# Patient Record
Sex: FEMALE | Race: WHITE | NOT HISPANIC OR LATINO | Employment: OTHER | URBAN - METROPOLITAN AREA
[De-identification: names, ages, dates, MRNs, and addresses within clinical notes are randomized per-mention and may not be internally consistent; named-entity substitution may affect disease eponyms.]

---

## 2020-04-14 ENCOUNTER — OFFICE VISIT (OUTPATIENT)
Dept: GASTROENTEROLOGY | Facility: CLINIC | Age: 45
End: 2020-04-14
Payer: COMMERCIAL

## 2020-04-14 VITALS
HEIGHT: 65 IN | BODY MASS INDEX: 26.66 KG/M2 | DIASTOLIC BLOOD PRESSURE: 82 MMHG | SYSTOLIC BLOOD PRESSURE: 120 MMHG | WEIGHT: 160 LBS

## 2020-04-14 DIAGNOSIS — K62.5 RECTAL BLEEDING: ICD-10-CM

## 2020-04-14 DIAGNOSIS — K64.8 INTERNAL HEMORRHOIDS: Primary | ICD-10-CM

## 2020-04-14 PROCEDURE — 46221 LIGATION OF HEMORRHOID(S): CPT | Performed by: INTERNAL MEDICINE

## 2020-04-28 ENCOUNTER — OFFICE VISIT (OUTPATIENT)
Dept: GASTROENTEROLOGY | Facility: CLINIC | Age: 45
End: 2020-04-28
Payer: COMMERCIAL

## 2020-04-28 DIAGNOSIS — K64.8 INTERNAL HEMORRHOIDS: ICD-10-CM

## 2020-04-28 PROCEDURE — 46221 LIGATION OF HEMORRHOID(S): CPT | Performed by: INTERNAL MEDICINE

## 2021-03-10 DIAGNOSIS — Z23 ENCOUNTER FOR IMMUNIZATION: ICD-10-CM

## 2021-03-13 ENCOUNTER — OFFICE VISIT (OUTPATIENT)
Dept: URGENT CARE | Facility: CLINIC | Age: 46
End: 2021-03-13
Payer: COMMERCIAL

## 2021-03-13 VITALS
BODY MASS INDEX: 26.93 KG/M2 | HEART RATE: 96 BPM | TEMPERATURE: 97.4 F | DIASTOLIC BLOOD PRESSURE: 69 MMHG | WEIGHT: 167.6 LBS | HEIGHT: 66 IN | OXYGEN SATURATION: 100 % | SYSTOLIC BLOOD PRESSURE: 157 MMHG | RESPIRATION RATE: 16 BRPM

## 2021-03-13 DIAGNOSIS — L03.011 CELLULITIS OF FINGER OF RIGHT HAND: Primary | ICD-10-CM

## 2021-03-13 PROCEDURE — 99213 OFFICE O/P EST LOW 20 MIN: CPT | Performed by: PHYSICIAN ASSISTANT

## 2021-03-13 RX ORDER — DOXYCYCLINE HYCLATE 100 MG/1
100 CAPSULE ORAL EVERY 12 HOURS SCHEDULED
Qty: 14 CAPSULE | Refills: 0 | Status: SHIPPED | OUTPATIENT
Start: 2021-03-13 | End: 2021-03-20

## 2021-03-14 NOTE — PROGRESS NOTES
[id number]        NAME: Bobbi Galeana is a 50 y o  female  : 1975    MRN: [id number]  DATE: 2021  TIME: 7:19 PM    Assessment and Plan   Cellulitis of finger of right hand [L03 011]  1  Cellulitis of finger of right hand  doxycycline hyclate (VIBRAMYCIN) 100 mg capsule         Patient Instructions     Take antibiotic as directed with food  Recommend probiotics for side effects  Continue with over-the-counter symptom relief  Monitor for worsening symptoms  C/w Epsom salt soaks  Follow up with PCP in 3-5 days  Proceed to  ER if symptoms worsen  Chief Complaint     Chief Complaint   Patient presents with    Finger Injury     Tuesday, R pointer finger DIJ and distally finger became sore  Now red, swollen, white head  Pt reports UTD with tetenus  History of Present Illness       Patient presents with complaint of pain and swelling in her right index finger for the past 4 days  She denies any known injury but suspects a hair splinter may have caused the infection  Patient reports soaking in Epsom salts without significant improvement  She denies fever, chills, sweats, paresthesias  She reports that the pain is mild and describes it as a tightness  She denies known antibiotic allergies and recent antibiotic use  Review of Systems   Review of Systems   Constitutional: Negative for chills and fever  HENT: Negative for ear pain and sore throat  Eyes: Negative for pain and visual disturbance  Respiratory: Negative for cough and shortness of breath  Cardiovascular: Negative for chest pain and palpitations  Gastrointestinal: Negative for abdominal pain and vomiting  Genitourinary: Negative for dysuria and hematuria  Musculoskeletal: Positive for joint swelling  Negative for arthralgias and back pain  Skin: Positive for color change  Negative for rash  Neurological: Negative for seizures and syncope     All other systems reviewed and are negative  Current Medications       Current Outpatient Medications:     doxycycline hyclate (VIBRAMYCIN) 100 mg capsule, Take 1 capsule (100 mg total) by mouth every 12 (twelve) hours for 7 days, Disp: 14 capsule, Rfl: 0    Current Allergies     Allergies as of 03/13/2021    (No Known Allergies)            The following portions of the patient's history were reviewed and updated as appropriate: allergies, current medications, past family history, past medical history, past social history, past surgical history and problem list      History reviewed  No pertinent past medical history  Past Surgical History:   Procedure Laterality Date    COLONOSCOPY  10/30/2017    Rectal bleeding, internal hemorrhoids    HEMORROIDECTOMY         Family History   Problem Relation Age of Onset    Heart disease Father          Medications have been verified  Objective   /69   Pulse 96   Temp (!) 97 4 °F (36 3 °C)   Resp 16   Ht 5' 5 5" (1 664 m)   Wt 76 kg (167 lb 9 6 oz)   SpO2 100%   BMI 27 47 kg/m²   No LMP recorded  Physical Exam     Physical Exam  Vitals signs and nursing note reviewed  Constitutional:       General: She is not in acute distress  Appearance: Normal appearance  She is well-developed  She is not ill-appearing or diaphoretic  HENT:      Head: Normocephalic and atraumatic  Eyes:      Conjunctiva/sclera: Conjunctivae normal       Pupils: Pupils are equal, round, and reactive to light  Neck:      Musculoskeletal: Normal range of motion and neck supple  Cardiovascular:      Rate and Rhythm: Normal rate and regular rhythm  Heart sounds: Normal heart sounds  Pulmonary:      Effort: Pulmonary effort is normal  No respiratory distress  Breath sounds: Normal breath sounds  No stridor  No wheezing, rhonchi or rales  Musculoskeletal: Normal range of motion  General: Swelling and tenderness present     Lymphadenopathy:      Cervical: No cervical adenopathy  Skin:     General: Skin is warm and dry  Capillary Refill: Capillary refill takes less than 2 seconds  Findings: Erythema present  No rash  Comments: Erythema and swelling of distal phalanx of right 2nd finger; sensation intact; cap refill <2; full active range of motion   Neurological:      Mental Status: She is alert and oriented to person, place, and time  Cranial Nerves: No cranial nerve deficit  Sensory: No sensory deficit  Psychiatric:         Behavior: Behavior normal          Thought Content:  Thought content normal

## 2021-03-29 ENCOUNTER — IMMUNIZATIONS (OUTPATIENT)
Dept: FAMILY MEDICINE CLINIC | Facility: HOSPITAL | Age: 46
End: 2021-03-29

## 2021-03-29 DIAGNOSIS — Z23 ENCOUNTER FOR IMMUNIZATION: Primary | ICD-10-CM

## 2021-03-29 PROCEDURE — 91301 SARS-COV-2 / COVID-19 MRNA VACCINE (MODERNA) 100 MCG: CPT

## 2021-03-29 PROCEDURE — 0011A SARS-COV-2 / COVID-19 MRNA VACCINE (MODERNA) 100 MCG: CPT

## 2021-04-30 ENCOUNTER — IMMUNIZATIONS (OUTPATIENT)
Dept: FAMILY MEDICINE CLINIC | Facility: HOSPITAL | Age: 46
End: 2021-04-30

## 2021-04-30 DIAGNOSIS — Z23 ENCOUNTER FOR IMMUNIZATION: Primary | ICD-10-CM

## 2021-04-30 PROCEDURE — 91301 SARS-COV-2 / COVID-19 MRNA VACCINE (MODERNA) 100 MCG: CPT

## 2021-04-30 PROCEDURE — 0012A SARS-COV-2 / COVID-19 MRNA VACCINE (MODERNA) 100 MCG: CPT

## 2021-12-01 ENCOUNTER — VBI (OUTPATIENT)
Dept: ADMINISTRATIVE | Facility: OTHER | Age: 46
End: 2021-12-01

## 2021-12-11 PROBLEM — Z80.3 FAMILY HISTORY OF BREAST CANCER: Status: ACTIVE | Noted: 2021-12-11

## 2022-02-11 ENCOUNTER — ANNUAL EXAM (OUTPATIENT)
Dept: OBGYN CLINIC | Facility: CLINIC | Age: 47
End: 2022-02-11
Payer: COMMERCIAL

## 2022-02-11 VITALS
HEIGHT: 65 IN | WEIGHT: 166.4 LBS | BODY MASS INDEX: 27.72 KG/M2 | SYSTOLIC BLOOD PRESSURE: 100 MMHG | DIASTOLIC BLOOD PRESSURE: 60 MMHG

## 2022-02-11 DIAGNOSIS — Z12.31 BREAST CANCER SCREENING BY MAMMOGRAM: ICD-10-CM

## 2022-02-11 DIAGNOSIS — Z01.419 ENCOUNTER FOR GYNECOLOGICAL EXAMINATION (GENERAL) (ROUTINE) WITHOUT ABNORMAL FINDINGS: Primary | ICD-10-CM

## 2022-02-11 DIAGNOSIS — Z12.4 SCREENING FOR MALIGNANT NEOPLASM OF THE CERVIX: ICD-10-CM

## 2022-02-11 DIAGNOSIS — R92.2 DENSE BREAST TISSUE ON MAMMOGRAM: ICD-10-CM

## 2022-02-11 DIAGNOSIS — Z80.3 FAMILY HISTORY OF BREAST CANCER IN FEMALE: ICD-10-CM

## 2022-02-11 DIAGNOSIS — Z80.3 FAMILY HISTORY OF BREAST CANCER: ICD-10-CM

## 2022-02-11 PROCEDURE — S0612 ANNUAL GYNECOLOGICAL EXAMINA: HCPCS | Performed by: OBSTETRICS & GYNECOLOGY

## 2022-02-11 NOTE — ASSESSMENT & PLAN NOTE
All well, no complaints  Normal breast and pelvic exams   Pap done  Mammo order given with screening u/s for dense tissue and h/o u/s requirements following mammograms  Colonoscopy 2017, due 2023

## 2022-02-11 NOTE — PROGRESS NOTES
Assessment/Plan:    Encounter for gynecological examination (general) (routine) without abnormal findings  All well, no complaints  Normal breast and pelvic exams  Pap done  Mammo order given with screening u/s for dense tissue and h/o u/s requirements following mammograms  Colonoscopy 2017, due 2023       Diagnoses and all orders for this visit:    Encounter for gynecological examination (general) (routine) without abnormal findings    Family history of breast cancer in female  -     Mammo screening bilateral w 3d & cad; Future    Breast cancer screening by mammogram  -     Mammo screening bilateral w 3d & cad; Future    Screening for malignant neoplasm of the cervix  -     IGP, Aptima HPV, Rfx 16/18,45    Dense breast tissue on mammogram  -     US breast screening bilateral complete (ABUS); Future    Family history of breast cancer - maternal grandmother, paternal aunt          Subjective:      Patient ID: James Bradford is a 48 y o  female  Here for well check  The following portions of the patient's history were reviewed and updated as appropriate:   She  has a past medical history of Menorrhagia and VSD (ventricular septal defect)  She   Patient Active Problem List    Diagnosis Date Noted    Encounter for gynecological examination (general) (routine) without abnormal findings 02/11/2022    Family history of breast cancer - maternal grandmother, paternal aunt 12/11/2021     She  has a past surgical history that includes Colonoscopy (10/30/2017); Hemorroidectomy (2021); and Mammo (historical) (11/01/2021)  Her family history includes Breast cancer in her maternal grandmother and paternal aunt; Colon cancer in her maternal grandmother; Diabetes in her maternal grandfather and paternal grandfather; Heart disease in her father; Hypertension in her maternal grandfather; Liver cancer in her maternal grandmother; Thyroid cancer in her cousin and paternal aunt  She  reports that she has never smoked   She has never used smokeless tobacco  She reports current alcohol use  She reports that she does not use drugs  No current outpatient medications on file  No current facility-administered medications for this visit  She has No Known Allergies       Review of Systems  No breast, bladder, bowel changes  No new persistent pain, bloating, early satiety or pelvic pressure   Vaginal irritation  No menorrhagia, dysmenorrhea, intermenstrual bleeding      Objective:      /60 (BP Location: Left arm, Patient Position: Sitting, Cuff Size: Large)   Ht 5' 4 5" (1 638 m)   Wt 75 5 kg (166 lb 6 4 oz)   LMP 01/07/2022 (Exact Date)   Breastfeeding No   BMI 28 12 kg/m²          Physical Exam  General appearance: no distress, pleasant  Neck: thyroid without nodules or thyromegaly, no palpable adenopathy  Lymph nodes: no palpable adenopathy  Breasts: no masses, nodes or skin changes  Abdomen: soft, non tender, no palpable masses  Pelvic exam: normal external genitalia with tiny WE medial left labia minora c/w irritation, urethral meatus normal, vagina without lesions, cervix without lesions, uterus small, non tender, no adnexal masses, non tender  Rectal exam: no masses, non tender, RV confirms above

## 2022-02-11 NOTE — LETTER
February 11, 2022     [address] [employer]    Patient: Miranda Higgins   YOB: 1975   Date of Visit: 2/11/2022       Dear Dr Dipika Jay: Thank you for referring Pau Ashrafita to me for evaluation  Below are my notes for this consultation  If you have questions, please do not hesitate to call me  I look forward to following your patient along with you  Sincerely,        Arnaud Anderson MD        CC: No Recipients  Arnaud Anderson MD  2/11/2022  3:53 PM  Sign when Signing Visit  Assessment/Plan:    Encounter for gynecological examination (general) (routine) without abnormal findings  All well, no complaints  Normal breast and pelvic exams  Pap done  Mammo order given with screening u/s for dense tissue and h/o u/s requirements following mammograms  Colonoscopy 2017, due 2023       Diagnoses and all orders for this visit:    Encounter for gynecological examination (general) (routine) without abnormal findings    Family history of breast cancer in female  -     Mammo screening bilateral w 3d & cad; Future    Breast cancer screening by mammogram  -     Mammo screening bilateral w 3d & cad; Future    Screening for malignant neoplasm of the cervix  -     IGP, Aptima HPV, Rfx 16/18,45    Dense breast tissue on mammogram  -     US breast screening bilateral complete (ABUS); Future    Family history of breast cancer - maternal grandmother, paternal aunt          Subjective:      Patient ID: Miranda Higgins is a 48 y o  female  Here for well check  The following portions of the patient's history were reviewed and updated as appropriate:   She  has a past medical history of Menorrhagia and VSD (ventricular septal defect)    She   Patient Active Problem List    Diagnosis Date Noted    Encounter for gynecological examination (general) (routine) without abnormal findings 02/11/2022    Family history of breast cancer - maternal grandmother, paternal aunt 12/11/2021 She  has a past surgical history that includes Colonoscopy (10/30/2017); Hemorroidectomy (2021); and Mammo (historical) (11/01/2021)  Her family history includes Breast cancer in her maternal grandmother and paternal aunt; Colon cancer in her maternal grandmother; Diabetes in her maternal grandfather and paternal grandfather; Heart disease in her father; Hypertension in her maternal grandfather; Liver cancer in her maternal grandmother; Thyroid cancer in her cousin and paternal aunt  She  reports that she has never smoked  She has never used smokeless tobacco  She reports current alcohol use  She reports that she does not use drugs  No current outpatient medications on file  No current facility-administered medications for this visit  She has No Known Allergies       Review of Systems  No breast, bladder, bowel changes  No new persistent pain, bloating, early satiety or pelvic pressure   Vaginal irritation  No menorrhagia, dysmenorrhea, intermenstrual bleeding      Objective:      /60 (BP Location: Left arm, Patient Position: Sitting, Cuff Size: Large)   Ht 5' 4 5" (1 638 m)   Wt 75 5 kg (166 lb 6 4 oz)   LMP 01/07/2022 (Exact Date)   Breastfeeding No   BMI 28 12 kg/m²          Physical Exam  General appearance: no distress, pleasant  Neck: thyroid without nodules or thyromegaly, no palpable adenopathy  Lymph nodes: no palpable adenopathy  Breasts: no masses, nodes or skin changes  Abdomen: soft, non tender, no palpable masses  Pelvic exam: normal external genitalia with tiny WE medial left labia minora c/w irritation, urethral meatus normal, vagina without lesions, cervix without lesions, uterus small, non tender, no adnexal masses, non tender  Rectal exam: no masses, non tender, RV confirms above

## 2022-02-11 NOTE — PATIENT INSTRUCTIONS
Return to office in one year unless having any problems such as breast changes, bleeding, new persistent pain, new progressive bloating, new problems eating (getting full to quickly) or new constant urinary pressure that does not resolve in one week

## 2022-02-16 LAB
CYTOLOGIST CVX/VAG CYTO: NORMAL
DX ICD CODE: NORMAL
HPV I/H RISK 4 DNA CVX QL PROBE+SIG AMP: NEGATIVE
OTHER STN SPEC: NORMAL
PATH REPORT.FINAL DX SPEC: NORMAL
SL AMB NOTE:: NORMAL
SL AMB SPECIMEN ADEQUACY: NORMAL
SL AMB TEST METHODOLOGY: NORMAL

## 2023-04-28 ENCOUNTER — PREP FOR PROCEDURE (OUTPATIENT)
Dept: GASTROENTEROLOGY | Facility: CLINIC | Age: 48
End: 2023-04-28

## 2023-04-28 ENCOUNTER — TELEPHONE (OUTPATIENT)
Dept: GASTROENTEROLOGY | Facility: CLINIC | Age: 48
End: 2023-04-28

## 2023-04-28 DIAGNOSIS — K64.8 INTERNAL HEMORRHOIDS: Primary | ICD-10-CM

## 2023-04-28 NOTE — TELEPHONE ENCOUNTER
Scheduled date of colonoscopy (as of today):6/12/23  Physician performing colonoscopy:STEPHENIE  Location of colonoscopy:[facility]  Clearances: NA    PT SEES A CARDIOLOGIST FOR VSD

## 2023-04-28 NOTE — TELEPHONE ENCOUNTER
04/28/23  Screened by: Marshal Downing    Referring Provider Recall Williams Deal    Pre- Screening: There is no height or weight on file to calculate BMI  Has patient been referred for a routine screening Colonoscopy? yes  Is the patient between 39-70 years old? yes      Previous Colonoscopy yes   If yes:    Date:     Facility:     Reason:       SCHEDULING STAFF: If the patient is between 39yrs-47yrs, please advise patient to confirm benefits/coverage with their insurance company for a routine screening colonoscopy, some insurance carriers will only cover at [address] or older  If the patient is over 66years old, please schedule an office visit  Does the patient want to see a Gastroenterologist prior to their procedure OR are they having any GI symptoms? no    Has the patient been hospitalized or had abdominal surgery in the past 6 months? No    Does the patient use supplemental oxygen? no    Does the patient take Coumadin, Lovenox, Plavix, Elliquis, Xarelto, or other blood thinning medication? no    Has the patient had a stroke, cardiac event, or stent placed in the past year? no    SCHEDULING STAFF: If patient answers NO to above questions, then schedule procedure  If patient answers YES to above questions, then schedule office appointment  If patient is between 45yrs - 49yrs, please advise patient that we will have to confirm benefits & coverage with their insurance company for a routine screening colonoscopy

## 2023-05-30 ENCOUNTER — TELEPHONE (OUTPATIENT)
Dept: GASTROENTEROLOGY | Facility: CLINIC | Age: 48
End: 2023-05-30

## 2023-05-30 DIAGNOSIS — Z12.11 SCREENING FOR COLON CANCER: Primary | ICD-10-CM

## 2023-06-11 ENCOUNTER — ANESTHESIA (OUTPATIENT)
Dept: ANESTHESIOLOGY | Facility: AMBULATORY SURGERY CENTER | Age: 48
End: 2023-06-11

## 2023-06-11 ENCOUNTER — ANESTHESIA EVENT (OUTPATIENT)
Dept: ANESTHESIOLOGY | Facility: AMBULATORY SURGERY CENTER | Age: 48
End: 2023-06-11

## 2023-06-12 ENCOUNTER — HOSPITAL ENCOUNTER (OUTPATIENT)
Dept: GASTROENTEROLOGY | Facility: AMBULATORY SURGERY CENTER | Age: 48
Discharge: HOME | End: 2023-06-12
Payer: COMMERCIAL

## 2023-06-12 ENCOUNTER — ANESTHESIA EVENT (OUTPATIENT)
Dept: GASTROENTEROLOGY | Facility: AMBULATORY SURGERY CENTER | Age: 48
End: 2023-06-12

## 2023-06-12 ENCOUNTER — ANESTHESIA (OUTPATIENT)
Dept: GASTROENTEROLOGY | Facility: AMBULATORY SURGERY CENTER | Age: 48
End: 2023-06-12

## 2023-06-12 VITALS
BODY MASS INDEX: 27.66 KG/M2 | HEIGHT: 65 IN | DIASTOLIC BLOOD PRESSURE: 65 MMHG | WEIGHT: 166 LBS | SYSTOLIC BLOOD PRESSURE: 126 MMHG | OXYGEN SATURATION: 99 % | RESPIRATION RATE: 16 BRPM | TEMPERATURE: 98.9 F | HEART RATE: 79 BPM

## 2023-06-12 DIAGNOSIS — K64.8 INTERNAL HEMORRHOIDS: ICD-10-CM

## 2023-06-12 DIAGNOSIS — Z12.11 SCREENING FOR COLON CANCER: ICD-10-CM

## 2023-06-12 LAB
EXT PREGNANCY TEST URINE: NEGATIVE
EXT. CONTROL: NORMAL

## 2023-06-12 PROCEDURE — 45385 COLONOSCOPY W/LESION REMOVAL: CPT | Performed by: INTERNAL MEDICINE

## 2023-06-12 RX ORDER — PROPOFOL 10 MG/ML
INJECTION, EMULSION INTRAVENOUS AS NEEDED
Status: DISCONTINUED | OUTPATIENT
Start: 2023-06-12 | End: 2023-06-12

## 2023-06-12 RX ORDER — SODIUM CHLORIDE, SODIUM LACTATE, POTASSIUM CHLORIDE, CALCIUM CHLORIDE 600; 310; 30; 20 MG/100ML; MG/100ML; MG/100ML; MG/100ML
50 INJECTION, SOLUTION INTRAVENOUS CONTINUOUS
Status: DISCONTINUED | OUTPATIENT
Start: 2023-06-12 | End: 2023-06-12

## 2023-06-12 RX ADMIN — PROPOFOL 100 MG: 10 INJECTION, EMULSION INTRAVENOUS at 12:56

## 2023-06-12 RX ADMIN — SODIUM CHLORIDE, SODIUM LACTATE, POTASSIUM CHLORIDE, CALCIUM CHLORIDE: 600; 310; 30; 20 INJECTION, SOLUTION INTRAVENOUS at 12:39

## 2023-06-12 RX ADMIN — PROPOFOL 100 MG: 10 INJECTION, EMULSION INTRAVENOUS at 12:53

## 2023-06-12 RX ADMIN — SODIUM CHLORIDE, SODIUM LACTATE, POTASSIUM CHLORIDE, CALCIUM CHLORIDE: 600; 310; 30; 20 INJECTION, SOLUTION INTRAVENOUS at 13:07

## 2023-06-12 RX ADMIN — SODIUM CHLORIDE, SODIUM LACTATE, POTASSIUM CHLORIDE, CALCIUM CHLORIDE 50 ML/HR: 600; 310; 30; 20 INJECTION, SOLUTION INTRAVENOUS at 12:45

## 2023-06-12 RX ADMIN — PROPOFOL 50 MG: 10 INJECTION, EMULSION INTRAVENOUS at 13:02

## 2023-06-12 RX ADMIN — PROPOFOL 50 MG: 10 INJECTION, EMULSION INTRAVENOUS at 12:59

## 2023-06-12 NOTE — ANESTHESIA POSTPROCEDURE EVALUATION
Post-Op Assessment Note    CV Status:  Stable  Pain Score: 0    Pain management: adequate     Mental Status:  Alert and awake   Hydration Status:  Euvolemic   PONV Controlled:  Controlled   Airway Patency:  Patent      Post Op Vitals Reviewed: Yes      Staff: CRNA         No notable events documented      BP   112/66   Temp   98   Pulse  79   Resp   16   SpO2   99

## 2023-06-12 NOTE — ANESTHESIA PREPROCEDURE EVALUATION
Procedure:  COLONOSCOPY    Relevant Problems   No relevant active problems     Menorrhagia VSD (ventricular septal defect)     Seen by Dr Michelle Timmons for Cardiac Workup prior to Hemorrhoidectomy 2021  ECHO- Normal LV and RV  size -EF 55-60%, Small Membranous VSD with L to R Shunting  Alexandr Ramirez No Major Valvular Disease  Elevated PAP-49 6  Physical Exam    Airway    Mallampati score: II  TM Distance: >3 FB  Neck ROM: full     Dental   No notable dental hx     Cardiovascular  Murmur,     Pulmonary      Other Findings        Anesthesia Plan  ASA Score- 3     Anesthesia Type- IV sedation with anesthesia with ASA Monitors  Additional Monitors:   Airway Plan:           Plan Factors-Exercise tolerance (METS): >4 METS  Chart reviewed  Patient is not a current smoker  Induction- intravenous  Postoperative Plan-     Informed Consent- Anesthetic plan and risks discussed with patient  I personally reviewed this patient with the CRNA  Discussed and agreed on the Anesthesia Plan with the CRNA  Alexandr Ramirez

## 2023-06-12 NOTE — H&P
"History and Physical - [facility]  [facility] Pam Delay 46 y o  female MRN: [id number]    HPI: Paty Live is a 46y o  year old female who presents for colon cancer screening    REVIEW OF SYSTEMS: Per the HPI, and otherwise unremarkable  Historical Information   Past Medical History:   Diagnosis Date   • Menorrhagia    • VSD (ventricular septal defect)      Past Surgical History:   Procedure Laterality Date   • COLONOSCOPY  10/30/2017    Rectal bleeding, internal hemorrhoids   Due 203   • HEMORROIDECTOMY  2021   • MAMMO (HISTORICAL)  11/01/2021    2C reviewed on [employer]     Social History   Social History     Substance and Sexual Activity   Alcohol Use Yes   • Alcohol/week: 2 0 - 3 0 standard drinks of alcohol   • Types: 2 - 3 Shots of liquor per week     Social History     Substance and Sexual Activity   Drug Use Never     Social History     Tobacco Use   Smoking Status Never   Smokeless Tobacco Never   Tobacco Comments    Former smoker per Ob/gyn chart     Family History   Problem Relation Age of Onset   • Heart disease Father    • Liver cancer Maternal Grandmother    • Breast cancer Maternal Grandmother    • Colon cancer Maternal Grandmother    • Diabetes Maternal Grandfather    • Hypertension Maternal Grandfather    • Diabetes Paternal Grandfather    • Breast cancer Paternal Aunt    • Thyroid cancer Paternal Aunt    • Thyroid cancer Cousin    • Uterine cancer Neg Hx    • Ovarian cancer Neg Hx        Meds/Allergies       Current Outpatient Medications:   •  sodium picosulfate, magnesium oxide, citric acid (Clenpiq) oral solution    Current Facility-Administered Medications:   •  lactated ringers infusion, 50 mL/hr, Intravenous, Continuous, 50 mL/hr at 06/12/23 1245    No Known Allergies    Objective     /78   Pulse 81   Temp 98 9 °F (37 2 °C) (Temporal)   Resp 20   Ht 5' 4 5\" (1 638 m)   Wt 75 3 kg (166 lb)   SpO2 95%   BMI 28 05 kg/m²     PHYSICAL EXAM    Gen: NAD " AAOx3  Head: Normocephalic, Atraumatic  CV: S1S2 RRR no m/r/g  CHEST: Clear b/l no c/r/w  ABD: soft, +BS NT/ND  EXT: no edema    ASSESSMENT/PLAN:  This is a 46y o  year old female here for colonoscopy, and she is stable and optimized for her procedure

## 2024-02-21 PROBLEM — Z01.419 ENCOUNTER FOR GYNECOLOGICAL EXAMINATION (GENERAL) (ROUTINE) WITHOUT ABNORMAL FINDINGS: Status: RESOLVED | Noted: 2022-02-11 | Resolved: 2024-02-21

## 2024-02-26 ENCOUNTER — TELEPHONE (OUTPATIENT)
Dept: OBGYN CLINIC | Facility: CLINIC | Age: 49
End: 2024-02-26

## 2024-02-26 DIAGNOSIS — Z12.31 ENCOUNTER FOR SCREENING MAMMOGRAM FOR BREAST CANCER: ICD-10-CM

## 2024-02-26 DIAGNOSIS — Z80.3 FAMILY HISTORY OF BREAST CANCER: Primary | ICD-10-CM

## 2024-02-26 NOTE — TELEPHONE ENCOUNTER
Patient had to cancel Mammogram Appointment with [facility] due to not having an order. Patient requesting to have an order generated prior to Wellness appointment in March.

## 2024-03-04 NOTE — TELEPHONE ENCOUNTER
Pt calling again  because she still has not heard back  regarding the need for Mammogram order  she said she wants to get her Mammogram  on 03/11/2024 and request someone call her  when order is sent

## 2024-03-04 NOTE — TELEPHONE ENCOUNTER
Screening Mammogram order placed in pts chart. Will have  contact pt to  Mammogram order at [facility].

## 2024-03-08 NOTE — PROGRESS NOTES
Assessment/Plan:    Encounter for gynecological examination (general) (routine) without abnormal findings  Here for well check, LMP end of Jan; q 3 months, heavy 2/4 days. No IMB. Night sweats with sleep disturbance and fatigue.   Very difficult six months with tragic death of 22 yo son (MVA), now raising his 2 year old.    Normal breast and pelvic exams.  Last pap 2/2022 neg/HPV neg; due 2027  Mammo order given, last 3/6/24, normal BIRADS 2C, reviewed on [employer]  Colonoscopy 6/2023, due 2028  Dexa @65, calcium recs reviewed.    Menopausal symptoms  Sleep disturbance biggest issue due to night sweats. Longest interval of menses 3 months.   Discussed with patient hot flashes, night sweats and sleep deprivation. Behavioral modification, avoidance of exaccerbating agents (alcohol, red wine, hot liquids), natural herbs (black cohash), SSRI's and hormone replacement therapy reviewed.   Benefit of SSRI's encouraged with recent life events, stressors.   Will try black cohosh and contact in the near future if interested in sertraline, HRT.  R&B of HRT reviewed, pt well aware of studies.       Diagnoses and all orders for this visit:    Encounter for screening mammogram for breast cancer  -     Mammo screening bilateral w 3d & cad; Future    Family history of breast cancer  -     Mammo screening bilateral w 3d & cad; Future    Encounter for gynecological examination (general) (routine) without abnormal findings    Other orders  -     Liquid-based pap, screening          Subjective:      Patient ID: Kisha Colby is a 48 y.o. female.    HPI Here for well check.    The following portions of the patient's history were reviewed and updated as appropriate: She  has a past medical history of Menorrhagia and VSD (ventricular septal defect).  She   Patient Active Problem List    Diagnosis Date Noted    Menopausal symptoms 03/11/2024    Family history of breast cancer - maternal grandmother, paternal aunt 12/11/2021     She  has  "a past surgical history that includes Colonoscopy (06/2023); Hemorroidectomy (2021); and Mammo (historical) (11/01/2021).  Her family history includes Breast cancer in her maternal grandmother and paternal aunt; Cancer in her paternal grandmother, sister, and sister; Colon cancer in her maternal grandmother; Diabetes in her maternal grandfather and paternal grandfather; Heart disease in her father; Hypertension in her maternal grandfather; Liver cancer in her maternal grandmother; Thyroid cancer in her cousin and paternal aunt; Thyroid disease in her sister and sister.  She  reports that she has quit smoking. Her smoking use included cigars. She has never used smokeless tobacco. She reports current alcohol use of about 2.0 - 3.0 standard drinks of alcohol per week. She reports that she does not use drugs.  No current outpatient medications on file.     No current facility-administered medications for this visit.     She has No Known Allergies..    Review of Systems  +night sweats +sleep disturbances +fatigue  No breast, bladder, bowel changes. No new persistent pain, bloating, early satiety or pelvic pressure  Menses q 3 months  +hemorrhoids    Objective:      /66 (BP Location: Left arm, Patient Position: Sitting, Cuff Size: Standard)   Ht 5' 4.5\" (1.638 m)   Wt 78.9 kg (174 lb)   BMI 29.41 kg/m²          Physical Exam    General appearance: no distress, pleasant  Neck: thyroid without nodules or thyromegaly, no palpable adenopathy  Lymph nodes: no palpable adenopathy  Breasts: no masses, nodes or skin changes  Abdomen: soft, non tender, no palpable masses  Pelvic exam: normal external genitalia, urethral meatus normal, vagina without lesions, cervix without lesions, uterus small, non tender, no adnexal masses, non tender  Rectal exam: deferred per request  "

## 2024-03-11 ENCOUNTER — OFFICE VISIT (OUTPATIENT)
Dept: OBGYN CLINIC | Facility: CLINIC | Age: 49
End: 2024-03-11
Payer: COMMERCIAL

## 2024-03-11 VITALS
DIASTOLIC BLOOD PRESSURE: 66 MMHG | WEIGHT: 174 LBS | SYSTOLIC BLOOD PRESSURE: 112 MMHG | BODY MASS INDEX: 28.99 KG/M2 | HEIGHT: 65 IN

## 2024-03-11 DIAGNOSIS — Z80.3 FAMILY HISTORY OF BREAST CANCER: ICD-10-CM

## 2024-03-11 DIAGNOSIS — Z12.31 ENCOUNTER FOR SCREENING MAMMOGRAM FOR BREAST CANCER: ICD-10-CM

## 2024-03-11 DIAGNOSIS — Z01.419 ENCOUNTER FOR GYNECOLOGICAL EXAMINATION (GENERAL) (ROUTINE) WITHOUT ABNORMAL FINDINGS: Primary | ICD-10-CM

## 2024-03-11 PROBLEM — N95.1 MENOPAUSAL SYMPTOMS: Status: ACTIVE | Noted: 2024-03-11

## 2024-03-11 PROCEDURE — S0612 ANNUAL GYNECOLOGICAL EXAMINA: HCPCS | Performed by: OBSTETRICS & GYNECOLOGY

## 2024-03-11 NOTE — LETTER
March 11, 2024     Gris Solano, DO  [address] [facility]  [address]  [facility] [address]    Patient: Kisha Colby   YOB: 1975   Date of Visit: 3/11/2024       Dear Dr. Solano:    Kisha Colby was in today for her annual gyn exam. Below are my notes for this consultation.    If you have questions, please do not hesitate to call me. I look forward to following your patient along with you.         Sincerely,        Nancy Lazo MD        CC: No Recipients    Nancy Lazo MD  3/11/2024 10:35 AM  Sign when Signing Visit  Assessment/Plan:    Encounter for gynecological examination (general) (routine) without abnormal findings  Here for well check, LMP end of Jan; q 3 months, heavy 2/4 days. No IMB. Night sweats with sleep disturbance and fatigue.   Very difficult six months with tragic death of 20 yo son (MVA), now raising his 2 year old.    Normal breast and pelvic exams.  Last pap 2/2022 neg/HPV neg; due 2027  Mammo order given, last 3/6/24, normal BIRADS 2C, reviewed on [facility]  Colonoscopy 6/2023, due 2028  Dexa @65, calcium recs reviewed.    Menopausal symptoms  Sleep disturbance biggest issue due to night sweats. Longest interval of menses 3 months.   Discussed with patient hot flashes, night sweats and sleep deprivation. Behavioral modification, avoidance of exaccerbating agents (alcohol, red wine, hot liquids), natural herbs (black cohash), SSRI's and hormone replacement therapy reviewed.   Benefit of SSRI's encouraged with recent life events, stressors.   Will try black cohosh and contact in the near future if interested in sertraline, HRT.  R&B of HRT reviewed, pt well aware of studies.       Diagnoses and all orders for this visit:    Encounter for screening mammogram for breast cancer  -     Mammo screening bilateral w 3d & cad; Future    Family history of breast cancer  -     Mammo screening bilateral w 3d & cad; Future    Encounter for gynecological examination (general) (routine) without abnormal  "findings    Other orders  -     Liquid-based pap, screening          Subjective:      Patient ID: Kisha Colby is a 48 y.o. female.    HPI Here for well check.    The following portions of the patient's history were reviewed and updated as appropriate: She  has a past medical history of Menorrhagia and VSD (ventricular septal defect).  She   Patient Active Problem List    Diagnosis Date Noted   • Menopausal symptoms 03/11/2024   • Family history of breast cancer - maternal grandmother, paternal aunt 12/11/2021     She  has a past surgical history that includes Colonoscopy (06/2023); Hemorroidectomy (2021); and Mammo (historical) (11/01/2021).  Her family history includes Breast cancer in her maternal grandmother and paternal aunt; Cancer in her paternal grandmother, sister, and sister; Colon cancer in her maternal grandmother; Diabetes in her maternal grandfather and paternal grandfather; Heart disease in her father; Hypertension in her maternal grandfather; Liver cancer in her maternal grandmother; Thyroid cancer in her cousin and paternal aunt; Thyroid disease in her sister and sister.  She  reports that she has quit smoking. Her smoking use included cigars. She has never used smokeless tobacco. She reports current alcohol use of about 2.0 - 3.0 standard drinks of alcohol per week. She reports that she does not use drugs.  No current outpatient medications on file.     No current facility-administered medications for this visit.     She has No Known Allergies..    Review of Systems  +night sweats +sleep disturbances +fatigue  No breast, bladder, bowel changes. No new persistent pain, bloating, early satiety or pelvic pressure  Menses q 3 months  +hemorrhoids    Objective:      /66 (BP Location: Left arm, Patient Position: Sitting, Cuff Size: Standard)   Ht 5' 4.5\" (1.638 m)   Wt 78.9 kg (174 lb)   BMI 29.41 kg/m²          Physical Exam    General appearance: no distress, pleasant  Neck: thyroid " without nodules or thyromegaly, no palpable adenopathy  Lymph nodes: no palpable adenopathy  Breasts: no masses, nodes or skin changes  Abdomen: soft, non tender, no palpable masses  Pelvic exam: normal external genitalia, urethral meatus normal, vagina without lesions, cervix without lesions, uterus small, non tender, no adnexal masses, non tender  Rectal exam: deferred per request

## 2024-03-11 NOTE — ASSESSMENT & PLAN NOTE
Here for well check, LMP end of Jan; q 3 months, heavy 2/4 days. No IMB. Night sweats with sleep disturbance and fatigue.   Very difficult six months with tragic death of 22 yo son (MVA), now raising his 2 year old.    Normal breast and pelvic exams.  Last pap 2/2022 neg/HPV neg; due 2027  Mammo order given, last 3/6/24, normal BIRADS 2C, reviewed on [employer]  Colonoscopy 6/2023, due 2028  Dexa @65, calcium recs reviewed.

## 2024-03-11 NOTE — PATIENT INSTRUCTIONS
Return to office in one year unless having any problems such as breast changes, bleeding, new persistent pain, new progressive bloating, new problems eating (getting full to quickly) or new constant urinary pressure that does not resolve in one week.    Continue to strive for 1200 mg of calcium and 1000 IU of vitamin D daily in diet and supplements combined for your bone health.  You can only absorb 600 mg of calcium at a time. Avoid excess calcium as this may adversely effect your heart.  There are 400 mg of calcium in an 8 oz serving of dairy.    Black cohosh (Estroven); contact me if you are interested in the zoloft or hormone replacement therapy (ideally after no menses for six months).

## 2024-03-11 NOTE — ASSESSMENT & PLAN NOTE
Sleep disturbance biggest issue due to night sweats. Longest interval of menses 3 months.   Discussed with patient hot flashes, night sweats and sleep deprivation. Behavioral modification, avoidance of exaccerbating agents (alcohol, red wine, hot liquids), natural herbs (black cohash), SSRI's and hormone replacement therapy reviewed.   Benefit of SSRI's encouraged with recent life events, stressors.   Will try black cohosh and contact in the near future if interested in sertraline, HRT.  R&B of HRT reviewed, pt well aware of studies.

## 2024-04-09 ENCOUNTER — PATIENT MESSAGE (OUTPATIENT)
Dept: OBGYN CLINIC | Facility: CLINIC | Age: 49
End: 2024-04-09

## 2024-04-09 DIAGNOSIS — N95.1 MENOPAUSAL SYMPTOMS: Primary | ICD-10-CM

## 2024-05-21 ENCOUNTER — NURSE TRIAGE (OUTPATIENT)
Age: 49
End: 2024-05-21

## 2024-05-21 NOTE — TELEPHONE ENCOUNTER
Reason for Disposition   Information only question and nurse able to answer    Answer Assessment - Initial Assessment Questions  Zoila from Dr. Solano's office(pcp on file) called requesting last pap result on file be faxed to Dr. Solano.    Last pap on file from 2/2022 faxed to Dr. Solano through [facility]    Protocols used: Information Only Call - No Triage-ADULT-[place]

## 2025-01-30 DIAGNOSIS — Z00.6 ENCOUNTER FOR EXAMINATION FOR NORMAL COMPARISON OR CONTROL IN CLINICAL RESEARCH PROGRAM: ICD-10-CM

## 2025-02-26 ENCOUNTER — TELEPHONE (OUTPATIENT)
Age: 50
End: 2025-02-26

## 2025-02-26 NOTE — TELEPHONE ENCOUNTER
Patient called, requested to please fax their screening mammogram script to [facility] at (f) [phone].   Pt has annual with Dr Lazo 3/17 but requesting to schedule mammogram sooner.

## 2025-03-03 ENCOUNTER — APPOINTMENT (OUTPATIENT)
Dept: LAB | Facility: HOSPITAL | Age: 50
End: 2025-03-03
Payer: COMMERCIAL

## 2025-03-03 DIAGNOSIS — Z00.6 ENCOUNTER FOR EXAMINATION FOR NORMAL COMPARISON OR CONTROL IN CLINICAL RESEARCH PROGRAM: ICD-10-CM

## 2025-03-03 PROCEDURE — 36415 COLL VENOUS BLD VENIPUNCTURE: CPT

## 2025-03-11 ENCOUNTER — RESULTS FOLLOW-UP (OUTPATIENT)
Dept: OBGYN CLINIC | Facility: CLINIC | Age: 50
End: 2025-03-11

## 2025-03-14 LAB
APOB+LDLR+PCSK9 GENE MUT ANL BLD/T: NOT DETECTED
BRCA1+BRCA2 DEL+DUP + FULL MUT ANL BLD/T: NOT DETECTED
MLH1+MSH2+MSH6+PMS2 GN DEL+DUP+FUL M: NOT DETECTED

## 2025-03-15 NOTE — ASSESSMENT & PLAN NOTE
Here for well check, struggling with hot flashes, sleep disturbances, weight gain,menopause symptoms.   Cycles skips up to 4 months, earliest 2 months.   No other breast or gyn concerns.     Normal breast and pelvic exams.  Last pap 2/2022 neg/HPV neg; due 2027  Mammo order given, last 3/7/25 BIRADS 0, follow up scheduled 3/20/25 on [employer]  Colonoscopy 6/2023, due 2028  Dexa @65, no risk factors

## 2025-03-15 NOTE — ASSESSMENT & PLAN NOTE
Had Helix testing 3/2024 negative - APOB, BRCA1, BRCA2, EPCAM, LDLR, LDLRAP1, PCSK9, PMS2, MLH1, MSH2, MSH6

## 2025-03-15 NOTE — ASSESSMENT & PLAN NOTE
Incomplete relief with black cohosh, sertraline, nutrition supplements.   Options for HRT reviewed.   Risks and benefits discussed at length.   Aware of risks of estrogen including increased risk of clots in legs and lungs and cardiovascular events if started late in menopause. Questions answered.  Rx sent for vivelle 0.05 patch and cyclic progesterone 200 mg day 1-12 of month.   Recheck 3-4 months    Orders:    estradiol (Vivelle-Dot) 0.05 MG/24HR; Place 1 patch on the skin 2 (two) times a week    Progesterone (Prometrium) 200 MG CAPS; Take one capsule daily for day 1-12 of the month

## 2025-03-17 ENCOUNTER — ANNUAL EXAM (OUTPATIENT)
Dept: OBGYN CLINIC | Facility: CLINIC | Age: 50
End: 2025-03-17
Payer: COMMERCIAL

## 2025-03-17 VITALS
BODY MASS INDEX: 29.46 KG/M2 | DIASTOLIC BLOOD PRESSURE: 66 MMHG | SYSTOLIC BLOOD PRESSURE: 110 MMHG | WEIGHT: 176.8 LBS | HEIGHT: 65 IN

## 2025-03-17 DIAGNOSIS — Z80.3 FAMILY HISTORY OF BREAST CANCER: ICD-10-CM

## 2025-03-17 DIAGNOSIS — N95.1 MENOPAUSAL SYMPTOMS: ICD-10-CM

## 2025-03-17 DIAGNOSIS — Z01.419 ENCOUNTER FOR GYNECOLOGICAL EXAMINATION (GENERAL) (ROUTINE) WITHOUT ABNORMAL FINDINGS: Primary | ICD-10-CM

## 2025-03-17 DIAGNOSIS — Z87.2 HISTORY OF CYST OF BREAST: ICD-10-CM

## 2025-03-17 DIAGNOSIS — Z12.31 ENCOUNTER FOR SCREENING MAMMOGRAM FOR BREAST CANCER: ICD-10-CM

## 2025-03-17 PROCEDURE — S0612 ANNUAL GYNECOLOGICAL EXAMINA: HCPCS | Performed by: OBSTETRICS & GYNECOLOGY

## 2025-03-17 RX ORDER — PROGESTERONE 200 MG/1
CAPSULE ORAL
Qty: 36 CAPSULE | Refills: 1 | Status: SHIPPED | OUTPATIENT
Start: 2025-03-17

## 2025-03-17 RX ORDER — ESTRADIOL 0.05 MG/D
1 PATCH, EXTENDED RELEASE TRANSDERMAL 2 TIMES WEEKLY
Qty: 24 PATCH | Refills: 1 | Status: SHIPPED | OUTPATIENT
Start: 2025-03-17

## 2025-03-17 NOTE — LETTER
2025     Gris Solano, DO  [address] [facility]  [address]  [facility] [address]    Patient: Kisha Colby   YOB: 1975   Date of Visit: 3/17/2025       Dear Dr. Solano:    This is an addendum to the previous note sent addressing Kisha's breast cysts.      Sincerely,        Nancy Lazo MD        CC: No Recipients    Nancy Lazo MD  3/19/2025  9:57 AM  Addendum  Name: Kisha Colby      : 1975      MRN: [id number]  Encounter Provider: Nancy Lazo MD  Encounter Date: 3/17/2025   Encounter department: [facility] [place]  :  Assessment & Plan  Encounter for gynecological examination (general) (routine) without abnormal findings  Here for well check, struggling with hot flashes, sleep disturbances, weight gain,menopause symptoms.   Cycles skips up to 4 months, earliest 2 months.   No other breast or gyn concerns.     Normal breast and pelvic exams.  Last pap 2022 neg/HPV neg; due   Mammo order given, last 3/7/25 BIRADS 0, follow up scheduled 3/20/25 on [facility]  Colonoscopy 2023, due   Dexa @65, no risk factors       Menopausal symptoms  Incomplete relief with black cohosh, sertraline, nutrition supplements.   Options for HRT reviewed.   Risks and benefits discussed at length.   Aware of risks of estrogen including increased risk of clots in legs and lungs and cardiovascular events if started late in menopause. Questions answered.  Rx sent for vivelle 0.05 patch and cyclic progesterone 200 mg day 1-12 of month.   Recheck 3-4 months    Orders:  •  estradiol (Vivelle-Dot) 0.05 MG/24HR; Place 1 patch on the skin 2 (two) times a week  •  Progesterone (Prometrium) 200 MG CAPS; Take one capsule daily for day 1-12 of the month    History of cyst of breast  Current mammogram 3/7/25 BIRADS 0 with f/u scheduled 3/20/25. Exam with 1.5 cm cyst on left @3:00 consistent with stable imaging reviewed back to 2018.   Awaiting additional recommended right views.        Family history of breast cancer - maternal grandmother, paternal aunt  Had Hel[facility] testing 3/2024 negative - APOB, BRCA1, BRCA2, EPCAM, LDLR, LDLRAP1, PCSK9, PMS2, MLH1, MSH2, MSH6       Encounter for screening mammogram for breast cancer    Orders:  •  Mammo screening bilateral w 3d and cad; Future        History of Present Illness  HPI  Kisha Colby is a 49 y.o. female who presents for well check and discussion of menopause symptoms.    Review of Systems  No breast, bladder, bowel changes. No new persistent pain, bloating, early satiety or pelvic pressure  +irregular cycles, +hot flashes +sleep disturbances  No vaginal dryness  Past Medical History  Past Medical History:   Diagnosis Date   • Menorrhagia    • VSD (ventricular septal defect)      Past Surgical History:   Procedure Laterality Date   • COLONOSCOPY  06/2023    Due 2028   • HEMORROIDECTOMY  2021     Family History   Problem Relation Age of Onset   • Heart disease Father    • Liver cancer Maternal Grandmother    • Breast cancer Maternal Grandmother    • Colon cancer Maternal Grandmother    • Diabetes Maternal Grandfather    • Hypertension Maternal Grandfather    • Diabetes Paternal Grandfather    • Breast cancer Paternal Aunt    • Thyroid cancer Paternal Aunt    • Thyroid cancer Cousin    • Cancer Paternal Grandmother    • Cancer Sister    • Thyroid disease Sister    • Cancer Sister    • Thyroid disease Sister    • Uterine cancer Neg Hx    • Ovarian cancer Neg Hx       reports that she has quit smoking. Her smoking use included cigars. She has never used smokeless tobacco. She reports current alcohol use of about 2.0 - 3.0 standard drinks of alcohol per week. She reports that she does not use drugs.  Current Outpatient Medications   Medication Instructions   • estradiol (Vivelle-Dot) 0.05 MG/24HR 1 patch, Transdermal, 2 times weekly   • Progesterone (Prometrium) 200 MG CAPS Take one capsule daily for day 1-12 of the month   No Known Allergies     "  Objective  /66 (BP Location: Left arm, Patient Position: Sitting, Cuff Size: Standard)   Ht 5' 4.5\" (1.638 m)   Wt 80.2 kg (176 lb 12.8 oz)   LMP 02/24/2025   BMI 29.88 kg/m²      Physical Exam  General appearance: no distress, pleasant  Neck: thyroid without nodules or thyromegaly, no palpable adenopathy  Lymph nodes: no palpable adenopathy  Breasts: no masses, nodes or skin changes; left 3:00 1.5 cm mobile cyst/nodule  Abdomen: soft, non tender, no palpable masses  Pelvic exam: normal external genitalia, urethral meatus normal, vagina without lesions, cervix without lesions, uterus small, non tender, no adnexal masses, non tender  Rectal exam: no masses, non tender, RV confirms above        "

## 2025-03-17 NOTE — LETTER
2025     Gris Solano, DO  [address] [facility]  [address]  [facility] [address]    Patient: Kisha Colby   YOB: 1975   Date of Visit: 3/17/2025       Dear Dr. Solano:    Kisha Colby was in today for her annual gyn exam. Below are my notes for this visit.    If you have questions, please do not hesitate to call me. I look forward to following your patient along with you.         Sincerely,        Nancy Lazo MD        CC: No Recipients    Nancy Lazo MD  3/17/2025 10:56 AM  Sign when Signing Visit  Name: Kisha Colby      : 1975      MRN: [id number]  Encounter Provider: Nancy Lazo MD  Encounter Date: 3/17/2025   Encounter department: [facility] [place]  :  Assessment & Plan  Encounter for gynecological examination (general) (routine) without abnormal findings  Here for well check, struggling with hot flashes, sleep disturbances, weight gain,menopause symptoms.   Cycles skips up to 4 months, earliest 2 months.   No other breast or gyn concerns.     Normal breast and pelvic exams.  Last pap 2022 neg/HPV neg; due   Mammo order given, last 3/7/25 BIRADS 0, follow up scheduled 3/20/25 on [facility]  Colonoscopy 2023, due   Dexa @65, no risk factors       Menopausal symptoms  Incomplete relief with black cohosh, sertraline, nutrition supplements.   Options for HRT reviewed.   Risks and benefits discussed at length.   Aware of risks of estrogen including increased risk of clots in legs and lungs and cardiovascular events if started late in menopause. Questions answered.  Rx sent for vivelle 0.05 patch and cyclic progesterone 200 mg day 1-12 of month.   Recheck 3-4 months    Orders:  •  estradiol (Vivelle-Dot) 0.05 MG/24HR; Place 1 patch on the skin 2 (two) times a week  •  Progesterone (Prometrium) 200 MG CAPS; Take one capsule daily for day 1-12 of the month    Family history of breast cancer - maternal grandmother, paternal aunt  Had Helix testing  3/2024 negative - APOB, BRCA1, BRCA2, EPCAM, LDLR, LDLRAP1, PCSK9, PMS2, MLH1, MSH2, MSH6       Encounter for screening mammogram for breast cancer    Orders:  •  Mammo screening bilateral w 3d and cad; Future        History of Present Illness  HPI  Kisha Colby is a 49 y.o. female who presents for well check and discussion of menopause symptoms.    Review of Systems  No breast, bladder, bowel changes. No new persistent pain, bloating, early satiety or pelvic pressure  +irregular cycles, +hot flashes +sleep disturbances  No vaginal dryness  Past Medical History  Past Medical History:   Diagnosis Date   • Menorrhagia    • VSD (ventricular septal defect)      Past Surgical History:   Procedure Laterality Date   • COLONOSCOPY  06/2023    Due 2028   • HEMORROIDECTOMY  2021     Family History   Problem Relation Age of Onset   • Heart disease Father    • Liver cancer Maternal Grandmother    • Breast cancer Maternal Grandmother    • Colon cancer Maternal Grandmother    • Diabetes Maternal Grandfather    • Hypertension Maternal Grandfather    • Diabetes Paternal Grandfather    • Breast cancer Paternal Aunt    • Thyroid cancer Paternal Aunt    • Thyroid cancer Cousin    • Cancer Paternal Grandmother    • Cancer Sister    • Thyroid disease Sister    • Cancer Sister    • Thyroid disease Sister    • Uterine cancer Neg Hx    • Ovarian cancer Neg Hx       reports that she has quit smoking. Her smoking use included cigars. She has never used smokeless tobacco. She reports current alcohol use of about 2.0 - 3.0 standard drinks of alcohol per week. She reports that she does not use drugs.  Current Outpatient Medications   Medication Instructions   • estradiol (Vivelle-Dot) 0.05 MG/24HR 1 patch, Transdermal, 2 times weekly   • Progesterone (Prometrium) 200 MG CAPS Take one capsule daily for day 1-12 of the month   No Known Allergies      Objective  /66 (BP Location: Left arm, Patient Position: Sitting, Cuff Size: Standard)  "  Ht 5' 4.5\" (1.638 m)   Wt 80.2 kg (176 lb 12.8 oz)   LMP 02/24/2025   BMI 29.88 kg/m²      Physical Exam  General appearance: no distress, pleasant  Neck: thyroid without nodules or thyromegaly, no palpable adenopathy  Lymph nodes: no palpable adenopathy  Breasts: no masses, nodes or skin changes  Abdomen: soft, non tender, no palpable masses  Pelvic exam: normal external genitalia, urethral meatus normal, vagina without lesions, cervix without lesions, uterus small, non tender, no adnexal masses, non tender  Rectal exam: no masses, non tender, RV confirms above          "

## 2025-03-17 NOTE — PATIENT INSTRUCTIONS
Return to office in one year unless having any problems such as breast changes, bleeding, new persistent pain, new progressive bloating, new problems eating (getting full to quickly) or new constant urinary pressure that does not resolve in one week.    Continue to strive for 1200 mg of calcium and 1000 IU of vitamin D daily in diet and supplements combined for your bone health.  You can only absorb 600 mg of calcium at a time. Avoid excess calcium as this may adversely effect your heart.  There are 400 mg of calcium in an 8 oz serving of dairy.  [place] milk has 600 mg of calcium in an 8 oz serving.

## 2025-03-19 ENCOUNTER — TELEPHONE (OUTPATIENT)
Age: 50
End: 2025-03-19

## 2025-03-19 DIAGNOSIS — N95.1 MENOPAUSAL SYMPTOMS: ICD-10-CM

## 2025-03-19 PROBLEM — Z87.2 HISTORY OF CYST OF BREAST: Status: ACTIVE | Noted: 2025-03-19

## 2025-03-19 NOTE — ASSESSMENT & PLAN NOTE
Current mammogram 3/7/25 BIRADS 0 with f/u scheduled 3/20/25. Exam with 1.5 cm cyst on left @3:00 consistent with stable imaging reviewed back to 2018.   Awaiting additional recommended right views.

## 2025-03-19 NOTE — TELEPHONE ENCOUNTER
Pt  called request   refills  for  Progesterone  be  changed  to 2 instead  of  1  because Dr Lazo request  she return  in 3  months  and  she wont  have  enough  to last  that  long

## 2025-03-19 NOTE — TELEPHONE ENCOUNTER
Reviewed provider note with prescription details. Read script placed , confirmed quantity is for 36 capsules.   Patient states she will check with pharmacy b/c she only received 12 pills.  No other questions.

## 2025-03-19 NOTE — TELEPHONE ENCOUNTER
Please let Hope know that taking 12 tablets monthly, I gave her 3 months worth with the #36 dispensed and one refill if needed prior to her 3 month appt. The # given should suffice with the 12 days of progesterone per month.   Thanks.

## 2025-03-21 ENCOUNTER — RESULTS FOLLOW-UP (OUTPATIENT)
Dept: OBGYN CLINIC | Facility: CLINIC | Age: 50
End: 2025-03-21

## 2025-05-30 NOTE — PROGRESS NOTES
"Name: Kihsa Colby      : 1975      MRN: [id number]  Encounter Provider: Nancy Lazo MD  Encounter Date: 2025   Encounter department: [facility] [place]  :  Assessment & Plan  Menopausal symptoms  Feels great since HRT initiation.   Sleeping six hours; minimal hot flashes, no h/o night sweats. Bleeding minimal after progesterone course for 3 days. Would like to continue.   Refills sent    Last mammo 3/7/25 BIRADS 2C after additional right imaging  Orders:    estradiol (Vivelle-Dot) 0.05 MG/24HR; Place 1 patch on the skin 2 (two) times a week    Progesterone (Prometrium) 200 MG CAPS; Take one capsule daily for day 1-12 of the month    History of cyst of breast  For years on both sides, largest on left. Stable exam with 3:00 1.5 cm mobile cyst.   Offered breast surgery opinion if interested. Provider # given.       History of Present Illness   Kisha Colby is a 50 y.o. female who presents for 3 month follow up to initiation of HRT and repeat breast exam.    Review of Systems  See above  No changes in SBE    Past Medical History   Past Medical History[1]  Past Surgical History[2]  Family History[3]   reports that she has quit smoking. Her smoking use included cigars. She has never been exposed to tobacco smoke. She has never used smokeless tobacco. She reports current alcohol use of about 2.0 - 3.0 standard drinks of alcohol per week. She reports that she does not use drugs.  Current Outpatient Medications   Medication Instructions    estradiol (Vivelle-Dot) 0.05 MG/24HR 1 patch, Transdermal, 2 times weekly    Progesterone (Prometrium) 200 MG CAPS Take one capsule daily for day 1-12 of the month   Allergies[4]      Objective   /78 (BP Location: Left arm, Patient Position: Sitting, Cuff Size: Standard)   Ht 5' 4.5\" (1.638 m)   Wt 81.6 kg (180 lb)   LMP 2025   BMI 30.42 kg/m²      Physical Exam  Appears well, no apparent distress.   Does not appear anxious " or depressed.  Breasts: limited exam to left breast with stable, mobile 1.5 cm cyst @ 3:00. No nodes or skin changes         [1]   Past Medical History:  Diagnosis Date    Menorrhagia     VSD (ventricular septal defect)    [2]   Past Surgical History:  Procedure Laterality Date    COLONOSCOPY  06/2023    Due 2028    HEMORROIDECTOMY  2021   [3]   Family History  Problem Relation Name Age of Onset    Heart disease Father Choco Thomas     Liver cancer Maternal Grandmother Madhuri wilt     Breast cancer Maternal Grandmother Madhuri juarezt     Colon cancer Maternal Grandmother Madhuri wilt     Diabetes Maternal Grandfather Grzegorz wilt     Hypertension Maternal Grandfather Grzegorz pope     Diabetes Paternal Grandfather Sandoval Thomas     Breast cancer Paternal Aunt      Thyroid cancer Paternal Aunt      Thyroid cancer Cousin paternal     Cancer Paternal Grandmother Coni Thomas     Cancer Sister Marysol Pompei     Thyroid disease Sister Marysol Pompei     Cancer Sister Ladan Spadafora     Thyroid disease Sister Ladan Spadafora     Uterine cancer Neg Hx      Ovarian cancer Neg Hx     [4] No Known Allergies

## 2025-06-02 ENCOUNTER — OFFICE VISIT (OUTPATIENT)
Dept: OBGYN CLINIC | Facility: CLINIC | Age: 50
End: 2025-06-02
Payer: COMMERCIAL

## 2025-06-02 VITALS
DIASTOLIC BLOOD PRESSURE: 78 MMHG | SYSTOLIC BLOOD PRESSURE: 124 MMHG | BODY MASS INDEX: 29.99 KG/M2 | WEIGHT: 180 LBS | HEIGHT: 65 IN

## 2025-06-02 DIAGNOSIS — N95.1 MENOPAUSAL SYMPTOMS: ICD-10-CM

## 2025-06-02 DIAGNOSIS — Z87.2 HISTORY OF CYST OF BREAST: Primary | ICD-10-CM

## 2025-06-02 PROCEDURE — 99213 OFFICE O/P EST LOW 20 MIN: CPT | Performed by: OBSTETRICS & GYNECOLOGY

## 2025-06-02 RX ORDER — ESTRADIOL 0.05 MG/D
1 PATCH, EXTENDED RELEASE TRANSDERMAL 2 TIMES WEEKLY
Qty: 24 PATCH | Refills: 3 | Status: SHIPPED | OUTPATIENT
Start: 2025-06-02

## 2025-06-02 RX ORDER — PROGESTERONE 200 MG/1
CAPSULE ORAL
Qty: 36 CAPSULE | Refills: 3 | Status: SHIPPED | OUTPATIENT
Start: 2025-06-02

## 2025-06-02 NOTE — ASSESSMENT & PLAN NOTE
Feels great since HRT initiation.   Sleeping six hours; minimal hot flashes, no h/o night sweats. Bleeding minimal after progesterone course for 3 days. Would like to continue.   Refills sent    Last mammo 3/7/25 BIRADS 2C after additional right imaging  Orders:    estradiol (Vivelle-Dot) 0.05 MG/24HR; Place 1 patch on the skin 2 (two) times a week    Progesterone (Prometrium) 200 MG CAPS; Take one capsule daily for day 1-12 of the month

## 2025-06-02 NOTE — PATIENT INSTRUCTIONS
If you are interested in a breast surgeon opinion: Dr Leslee Bañuelos at [facility]: [phone]

## 2025-06-02 NOTE — ASSESSMENT & PLAN NOTE
For years on both sides, largest on left. Stable exam with 3:00 1.5 cm mobile cyst.   Offered breast surgery opinion if interested. Provider # given.

## 2025-08-08 ENCOUNTER — OFFICE VISIT (OUTPATIENT)
Age: 50
End: 2025-08-08
Payer: COMMERCIAL

## 2025-08-08 VITALS
BODY MASS INDEX: 28.96 KG/M2 | HEART RATE: 96 BPM | DIASTOLIC BLOOD PRESSURE: 60 MMHG | TEMPERATURE: 97.3 F | WEIGHT: 180.2 LBS | OXYGEN SATURATION: 99 % | SYSTOLIC BLOOD PRESSURE: 100 MMHG | HEIGHT: 66 IN

## 2025-08-08 DIAGNOSIS — H60.542 DERMATITIS OF EAR CANAL, LEFT: ICD-10-CM

## 2025-08-08 DIAGNOSIS — H65.00 ACUTE SEROUS OTITIS MEDIA, RECURRENCE NOT SPECIFIED, UNSPECIFIED LATERALITY: Primary | ICD-10-CM

## 2025-08-08 PROCEDURE — 99213 OFFICE O/P EST LOW 20 MIN: CPT | Performed by: FAMILY MEDICINE

## 2025-08-08 RX ORDER — SULFAMETHOXAZOLE AND TRIMETHOPRIM 800; 160 MG/1; MG/1
1 TABLET ORAL 2 TIMES DAILY
Qty: 14 TABLET | Refills: 0 | Status: SHIPPED | OUTPATIENT
Start: 2025-08-08 | End: 2025-08-15

## 2025-08-08 RX ORDER — CLOBETASOL PROPIONATE 0.5 MG/G
OINTMENT TOPICAL 2 TIMES DAILY
Qty: 15 G | Refills: 0 | Status: SHIPPED | OUTPATIENT
Start: 2025-08-08

## 2025-08-18 DIAGNOSIS — N95.1 MENOPAUSAL SYMPTOMS: Primary | ICD-10-CM

## 2025-08-20 LAB — ESTRADIOL SERPL-MCNC: 7.6 PG/ML
